# Patient Record
Sex: MALE
[De-identification: names, ages, dates, MRNs, and addresses within clinical notes are randomized per-mention and may not be internally consistent; named-entity substitution may affect disease eponyms.]

---

## 2020-12-28 ENCOUNTER — TRANSCRIPTION ENCOUNTER (OUTPATIENT)
Age: 60
End: 2020-12-28

## 2022-03-22 ENCOUNTER — APPOINTMENT (OUTPATIENT)
Dept: PULMONOLOGY | Facility: CLINIC | Age: 62
End: 2022-03-22
Payer: SELF-PAY

## 2022-03-22 ENCOUNTER — APPOINTMENT (OUTPATIENT)
Dept: PULMONOLOGY | Facility: CLINIC | Age: 62
End: 2022-03-22
Payer: COMMERCIAL

## 2022-03-22 VITALS
DIASTOLIC BLOOD PRESSURE: 87 MMHG | HEART RATE: 99 BPM | SYSTOLIC BLOOD PRESSURE: 130 MMHG | OXYGEN SATURATION: 92 % | RESPIRATION RATE: 16 BRPM | TEMPERATURE: 97.8 F

## 2022-03-22 DIAGNOSIS — Z01.812 ENCOUNTER FOR PREPROCEDURAL LABORATORY EXAMINATION: ICD-10-CM

## 2022-03-22 PROBLEM — Z00.00 ENCOUNTER FOR PREVENTIVE HEALTH EXAMINATION: Status: ACTIVE | Noted: 2022-03-22

## 2022-03-22 LAB
BASOPHILS # BLD AUTO: 0.06 K/UL
BASOPHILS NFR BLD AUTO: 0.8 %
CRP SERPL-MCNC: 3 MG/L
DEPRECATED D DIMER PPP IA-ACNC: <150 NG/ML DDU
EOSINOPHIL # BLD AUTO: 0.37 K/UL
EOSINOPHIL NFR BLD AUTO: 4.7 %
HCT VFR BLD CALC: 50.9 %
HGB BLD-MCNC: 16.3 G/DL
IMM GRANULOCYTES NFR BLD AUTO: 0.9 %
LYMPHOCYTES # BLD AUTO: 2.52 K/UL
LYMPHOCYTES NFR BLD AUTO: 32.3 %
MAN DIFF?: NORMAL
MCHC RBC-ENTMCNC: 29.8 PG
MCHC RBC-ENTMCNC: 32 GM/DL
MCV RBC AUTO: 93.1 FL
MONOCYTES # BLD AUTO: 0.61 K/UL
MONOCYTES NFR BLD AUTO: 7.8 %
NEUTROPHILS # BLD AUTO: 4.16 K/UL
NEUTROPHILS NFR BLD AUTO: 53.5 %
PLATELET # BLD AUTO: 229 K/UL
RBC # BLD: 5.47 M/UL
RBC # FLD: 12.1 %
SARS-COV-2 AG RESP QL IA.RAPID: NEGATIVE
WBC # FLD AUTO: 7.79 K/UL

## 2022-03-22 PROCEDURE — 94727 GAS DIL/WSHOT DETER LNG VOL: CPT

## 2022-03-22 PROCEDURE — 99204 OFFICE O/P NEW MOD 45 MIN: CPT | Mod: 25

## 2022-03-22 PROCEDURE — 94010 BREATHING CAPACITY TEST: CPT

## 2022-03-22 PROCEDURE — ZZZZZ: CPT

## 2022-03-22 PROCEDURE — 87811 SARS-COV-2 COVID19 W/OPTIC: CPT

## 2022-03-22 PROCEDURE — 36415 COLL VENOUS BLD VENIPUNCTURE: CPT

## 2022-03-22 PROCEDURE — 94729 DIFFUSING CAPACITY: CPT

## 2022-03-22 NOTE — HISTORY OF PRESENT ILLNESS
[TextBox_4] : Mr. Mohr is a pleasant 62-year-old gentleman with history of hypertension, hypercholesterolemia, s/p bilateral COVID pneumonia in [place] in May 2021, requiring hospitalization for 5 months, he was treated and was then discharged home on home oxygen.  He came in complaining of exertional dyspnea for the last 9 months, he also has mild cough, no chest pain, fever or chills.\par He is currently on Xarelto 10 mg p.o. daily for DVT prophylaxis.

## 2022-03-22 NOTE — REASON FOR VISIT
[Abnormal CXR/ Chest CT] : an abnormal CXR/ chest CT [Initial] : an initial visit [Shortness of Breath] : shortness of breath

## 2022-03-22 NOTE — PROCEDURE
[FreeTextEntry1] : Chest CT scan performed on March 9, 2022 showed diffuse mild groundglass density and interstitial thickening in bilateral lungs compatible with history of COVID-19 pneumonia.  Interstitial lung disease/fibrosis cannot be excluded.  Follow-up is suggested.  Focal streaky density in the right upper lung, likely scarring/atelectasis.  Ectatic aorta.\par \par Pulmonary function test performed in my office today: Spirometry shows suggestive evidence of moderate restrictive defect; lung volume shows moderate restrictive defect with air trapping; diffusion shows severe impairment.  SPO2 at rest on room air is 92%.\par \par  POCT COVID-19 (Binax Rapid Antigen Card)             Final\par \par No Documents Attached\par \par \par   Test   Result   Flag Reference Goal \par   POCT COVID-19 (Binax Rapid Antigen Card) Negative      \par \par  Ordered by: MABEL KELLY       Collected/Examined: 22Mar2022 10:42AM       \par Verified by: MABEL KELLY 22Mar2022 05:46PM       \par  Result Communication: No patient communication needed at this time;\par Stage: Final       \par  Performed at: In Office       Performed by: CHULA MORALES       Resulted: 22Mar2022 10:42AM       Last Updated: 22Mar2022 05:46PM       Accession: [id number]       \par

## 2022-03-22 NOTE — DISCUSSION/SUMMARY
[FreeTextEntry1] : Mr. Mohr is a patient with exertional dyspnea and cough, likely secondary to pulmonary fibrosis from history of extensive bilateral COVID pneumonia, rule out PE (unlikely in view patient on Xarelto), rule out inflammation.  Secondly, he is a patient with history of hypertension.  Thirdly, he is a patient with history of hypercholesterolemia.

## 2022-03-22 NOTE — ASSESSMENT
[FreeTextEntry1] : Venipuncture with labs drawn in office\par If no significant inflammation on the blood test, will then start him on antifibrotic agent Ofev for pulmonary fibrosis; if D-dimer is negative, will then discontinue Xarelto.\par Advised him on low-salt diet.\par Medications reviewed. Continue present medications.\par Return for pulmonary follow-up in 1 week.

## 2022-03-27 LAB
ALBUMIN SERPL ELPH-MCNC: 4.6 G/DL
ALP BLD-CCNC: 96 U/L
ALT SERPL-CCNC: 45 U/L
ANION GAP SERPL CALC-SCNC: 17 MMOL/L
AST SERPL-CCNC: 37 U/L
BILIRUB SERPL-MCNC: 0.4 MG/DL
BUN SERPL-MCNC: 13 MG/DL
CALCIUM SERPL-MCNC: 9.4 MG/DL
CHLORIDE SERPL-SCNC: 102 MMOL/L
CO2 SERPL-SCNC: 20 MMOL/L
CREAT SERPL-MCNC: 0.84 MG/DL
EGFR: 98 ML/MIN/1.73M2
FERRITIN SERPL-MCNC: 352 NG/ML
GLUCOSE SERPL-MCNC: 133 MG/DL
POTASSIUM SERPL-SCNC: 4.1 MMOL/L
PROCALCITONIN SERPL-MCNC: 0.03 NG/ML
PROT SERPL-MCNC: 7.4 G/DL
SODIUM SERPL-SCNC: 139 MMOL/L

## 2022-03-31 ENCOUNTER — APPOINTMENT (OUTPATIENT)
Dept: PULMONOLOGY | Facility: CLINIC | Age: 62
End: 2022-03-31
Payer: COMMERCIAL

## 2022-03-31 VITALS
RESPIRATION RATE: 16 BRPM | SYSTOLIC BLOOD PRESSURE: 111 MMHG | DIASTOLIC BLOOD PRESSURE: 78 MMHG | TEMPERATURE: 97.8 F | OXYGEN SATURATION: 98 % | HEART RATE: 105 BPM

## 2022-03-31 DIAGNOSIS — R06.00 DYSPNEA, UNSPECIFIED: ICD-10-CM

## 2022-03-31 DIAGNOSIS — J12.82 COVID-19: ICD-10-CM

## 2022-03-31 DIAGNOSIS — J84.10 PULMONARY FIBROSIS, UNSPECIFIED: ICD-10-CM

## 2022-03-31 DIAGNOSIS — U07.1 COVID-19: ICD-10-CM

## 2022-03-31 PROCEDURE — 99214 OFFICE O/P EST MOD 30 MIN: CPT

## 2022-04-02 PROBLEM — U07.1 PNEUMONIA DUE TO COVID-19 VIRUS: Status: ACTIVE | Noted: 2022-03-22

## 2022-04-02 PROBLEM — R06.00 EXERTIONAL DYSPNEA: Status: ACTIVE | Noted: 2022-03-22

## 2022-04-02 NOTE — PROCEDURE
[FreeTextEntry1] : Chest CT scan performed on March 9, 2022 showed diffuse mild groundglass density and interstitial thickening in bilateral lungs compatible with history of COVID-19 pneumonia.  Interstitial lung disease/fibrosis cannot be excluded.  Follow-up is suggested.  Focal streaky density in the right upper lung, likely scarring/atelectasis.  Ectatic aorta.\par \par

## 2022-04-02 NOTE — HISTORY OF PRESENT ILLNESS
[TextBox_4] : Mr. Mohr is a pleasant 62-year-old gentleman with history of hypertension, hypercholesterolemia, s/p bilateral COVID pneumonia in [place] in May 2021, requiring hospitalization for 5 months, he was treated and was then discharged home on home oxygen.  He came in complaining of exertional dyspnea for the last 9 months, he also has mild cough, no chest pain, fever or chills.\par He is currently on Xarelto 10 mg p.o. daily for DVT prophylaxis.

## 2022-04-02 NOTE — DISCUSSION/SUMMARY
[FreeTextEntry1] : Mr. Mohr is a patient with exertional dyspnea and cough, likely secondary to pulmonary fibrosis from history of extensive bilateral COVID pneumonia.  Secondly, he is a patient with history of hypertension.  Thirdly, he is a patient with history of hypercholesterolemia.

## 2022-04-02 NOTE — ASSESSMENT
[FreeTextEntry1] : Discussed with patient at length regarding his clinical conditions and chest CT scan findings, will start him on Ofev pulmonary fibrosis.\par Periodic chest CT scan for f/u